# Patient Record
Sex: FEMALE | Race: WHITE | NOT HISPANIC OR LATINO | URBAN - METROPOLITAN AREA
[De-identification: names, ages, dates, MRNs, and addresses within clinical notes are randomized per-mention and may not be internally consistent; named-entity substitution may affect disease eponyms.]

---

## 2020-10-09 ENCOUNTER — OFFICE VISIT (OUTPATIENT)
Dept: ENDOCRINOLOGY | Facility: CLINIC | Age: 45
End: 2020-10-09
Payer: COMMERCIAL

## 2020-10-09 VITALS
SYSTOLIC BLOOD PRESSURE: 109 MMHG | DIASTOLIC BLOOD PRESSURE: 66 MMHG | HEART RATE: 76 BPM | HEIGHT: 64 IN | WEIGHT: 123.44 LBS | BODY MASS INDEX: 21.07 KG/M2 | OXYGEN SATURATION: 98 %

## 2020-10-09 DIAGNOSIS — E06.3 HYPOTHYROIDISM DUE TO HASHIMOTO'S THYROIDITIS: Primary | ICD-10-CM

## 2020-10-09 DIAGNOSIS — E03.8 HYPOTHYROIDISM DUE TO HASHIMOTO'S THYROIDITIS: Primary | ICD-10-CM

## 2020-10-09 PROCEDURE — 99203 OFFICE O/P NEW LOW 30 MIN: CPT | Mod: S$GLB,,, | Performed by: INTERNAL MEDICINE

## 2020-10-09 PROCEDURE — 99203 PR OFFICE/OUTPT VISIT, NEW, LEVL III, 30-44 MIN: ICD-10-PCS | Mod: S$GLB,,, | Performed by: INTERNAL MEDICINE

## 2020-10-09 RX ORDER — LEVOTHYROXINE SODIUM 75 UG/1
75 TABLET ORAL
COMMUNITY
End: 2020-12-04 | Stop reason: SDUPTHER

## 2020-10-09 NOTE — ASSESSMENT & PLAN NOTE
Pt with hx hypothyroidism   - recent TSH low, suggesting overtreatment   - clinically, pt euthyroid   - of note, pt taking biotin on occasion. Sometimes every day, other times less often   - reviewed biotin can interefere with labs   - recommend repeat after stopping biotin for at least 5 days   - avoid iatrogenic hyperthyroidism due to risks of cardiac and bone complications   - if tsh still low would drop dose, otherwise continue same medication   - monitor labs 1-2 times a year, or sooner as needed based on symptoms   - pt expressed understanding

## 2020-10-09 NOTE — PROGRESS NOTES
Subjective:      Chief Complaint: Establish Care and Hypothyroidism    HPI: Eneida Oates is a 45 y.o. female who is here for an initial evaluation for thyroid.    Moved to the area recently (back and forth lately since pandemic really)    With regards to her hypothyroidism:   Dx about 5 years ago.  Current medication:  levothyroxine  Current dose: 75 mcg    Pt takes thyroid medication in the morning then has coffee. Denies missed doses/running out.   Takes multivitamin sometimes.  Takes biotin    Had labs 9/6/2020:   TSH 0.39. no free t4.    Current symptoms: feeling okay overall. Sometimes tired. Feels hot more often. Some trouble sleeping/anxiety.  No tremor, diarrhea, weight loss.    Reviewed past medical, family, social history and updated as appropriate.    Review of Systems   Constitutional: Positive for fatigue. Negative for unexpected weight change (gained a few lbs).   HENT: Negative for trouble swallowing.    Eyes: Positive for visual disturbance.   Respiratory: Negative for shortness of breath.    Cardiovascular: Negative for palpitations.   Gastrointestinal: Negative for constipation and diarrhea.   Endocrine: Positive for polydipsia.        Feels hot more often   Genitourinary: Negative for menstrual problem.   Neurological: Negative for tremors.   Psychiatric/Behavioral: Positive for sleep disturbance.        Sometimes anxiety lately, stresses     Objective:     Vitals:    10/09/20 1043   BP: 109/66   Pulse: 76     BP Readings from Last 5 Encounters:   10/09/20 109/66     Physical Exam  Vitals signs reviewed.   Constitutional:       Appearance: She is well-developed.   HENT:      Head: Normocephalic and atraumatic.   Neck:      Musculoskeletal: Normal range of motion and neck supple.      Thyroid: No thyromegaly.   Cardiovascular:      Rate and Rhythm: Normal rate and regular rhythm.      Heart sounds: No murmur.   Pulmonary:      Effort: Pulmonary effort is normal.      Breath sounds: Normal breath  sounds.   Abdominal:      General: There is no distension.      Palpations: Abdomen is soft. There is no mass.      Tenderness: There is no abdominal tenderness.   Musculoskeletal: Normal range of motion.         General: No tenderness.   Neurological:      Mental Status: She is alert and oriented to person, place, and time.   Psychiatric:         Judgment: Judgment normal.       Wt Readings from Last 5 Encounters:   10/09/20 1043 56 kg (123 lb 7.3 oz)     Assessment/Plan:     Hypothyroidism due to Hashimoto's thyroiditis  Pt with hx hypothyroidism   - recent TSH low, suggesting overtreatment   - clinically, pt euthyroid   - of note, pt taking biotin on occasion. Sometimes every day, other times less often   - reviewed biotin can interefere with labs   - recommend repeat after stopping biotin for at least 5 days   - avoid iatrogenic hyperthyroidism due to risks of cardiac and bone complications   - if tsh still low would drop dose, otherwise continue same medication   - monitor labs 1-2 times a year, or sooner as needed based on symptoms   - pt expressed understanding        Follow up in about 1 year (around 10/9/2021) for lab review, further monitoring.      David Howard MD  Endocrinology

## 2020-10-13 ENCOUNTER — LAB VISIT (OUTPATIENT)
Dept: LAB | Facility: HOSPITAL | Age: 45
End: 2020-10-13
Attending: INTERNAL MEDICINE
Payer: COMMERCIAL

## 2020-10-13 DIAGNOSIS — E03.8 HYPOTHYROIDISM DUE TO HASHIMOTO'S THYROIDITIS: ICD-10-CM

## 2020-10-13 DIAGNOSIS — E06.3 HYPOTHYROIDISM DUE TO HASHIMOTO'S THYROIDITIS: ICD-10-CM

## 2020-10-13 LAB
T4 FREE SERPL-MCNC: 1.06 NG/DL (ref 0.71–1.51)
THYROPEROXIDASE IGG SERPL-ACNC: 236.7 IU/ML
TSH SERPL DL<=0.005 MIU/L-ACNC: 0.67 UIU/ML (ref 0.4–4)

## 2020-10-13 PROCEDURE — 84443 ASSAY THYROID STIM HORMONE: CPT

## 2020-10-13 PROCEDURE — 86376 MICROSOMAL ANTIBODY EACH: CPT

## 2020-10-13 PROCEDURE — 84439 ASSAY OF FREE THYROXINE: CPT

## 2020-10-13 PROCEDURE — 36415 COLL VENOUS BLD VENIPUNCTURE: CPT | Mod: PO

## 2020-10-14 ENCOUNTER — TELEPHONE (OUTPATIENT)
Dept: ENDOCRINOLOGY | Facility: CLINIC | Age: 45
End: 2020-10-14

## 2020-10-14 NOTE — PROGRESS NOTES
Labs reviewed.  Please contact patient and let her know:   1. Thyroid function is normal. TSH, free t4 are fine. Continue the same dose.     Thanks,   - David

## 2020-12-04 DIAGNOSIS — E06.3 HYPOTHYROIDISM DUE TO HASHIMOTO'S THYROIDITIS: Primary | ICD-10-CM

## 2020-12-04 DIAGNOSIS — E03.8 HYPOTHYROIDISM DUE TO HASHIMOTO'S THYROIDITIS: Primary | ICD-10-CM

## 2020-12-04 RX ORDER — LEVOTHYROXINE SODIUM 75 UG/1
75 TABLET ORAL
Qty: 90 TABLET | Refills: 3 | Status: SHIPPED | OUTPATIENT
Start: 2020-12-04 | End: 2022-01-31

## 2020-12-05 ENCOUNTER — NURSE TRIAGE (OUTPATIENT)
Dept: ADMINISTRATIVE | Facility: CLINIC | Age: 45
End: 2020-12-05

## 2020-12-05 NOTE — TELEPHONE ENCOUNTER
Reason for Disposition   [1] Prescription prescribed recently is not at pharmacy AND [2] triager has access to patient's EMR AND [3] prescription is recorded in the EMR    Protocols used: MEDICATION QUESTION CALL-KING Monique requests confirmation that her levothyroxine has been re-ordered by Dr Howard and sent to [facility] pharmacy in [facility], where she is staying at present.  She says she divides her time between [place] and [place].  Assured her that the medication was re-ordered by Dr Howard, and with [facility] as she requested.  No additional concerns or questions at this time.  Message to Dr Howard.  Please contact caller directly with any additional care advice.